# Patient Record
Sex: MALE | Race: WHITE | NOT HISPANIC OR LATINO | Employment: OTHER | URBAN - METROPOLITAN AREA
[De-identification: names, ages, dates, MRNs, and addresses within clinical notes are randomized per-mention and may not be internally consistent; named-entity substitution may affect disease eponyms.]

---

## 2017-09-14 ENCOUNTER — ALLSCRIPTS OFFICE VISIT (OUTPATIENT)
Dept: OTHER | Facility: OTHER | Age: 72
End: 2017-09-14

## 2017-09-14 LAB
BILIRUB UR QL STRIP: NEGATIVE
CLARITY UR: NORMAL
COLOR UR: YELLOW
GLUCOSE (HISTORICAL): NEGATIVE
HGB UR QL STRIP.AUTO: NORMAL
KETONES UR STRIP-MCNC: NEGATIVE MG/DL
LEUKOCYTE ESTERASE UR QL STRIP: NEGATIVE
NITRITE UR QL STRIP: NEGATIVE
PH UR STRIP.AUTO: 6 [PH]
PROT UR STRIP-MCNC: NEGATIVE MG/DL
SP GR UR STRIP.AUTO: 1.01
UROBILINOGEN UR QL STRIP.AUTO: 0.2

## 2018-01-12 VITALS
HEIGHT: 73 IN | DIASTOLIC BLOOD PRESSURE: 76 MMHG | WEIGHT: 256 LBS | BODY MASS INDEX: 33.93 KG/M2 | SYSTOLIC BLOOD PRESSURE: 120 MMHG

## 2018-03-14 DIAGNOSIS — C61 MALIGNANT NEOPLASM OF PROSTATE (HCC): ICD-10-CM

## 2018-03-16 ENCOUNTER — OFFICE VISIT (OUTPATIENT)
Dept: UROLOGY | Facility: MEDICAL CENTER | Age: 73
End: 2018-03-16
Payer: COMMERCIAL

## 2018-03-16 VITALS
WEIGHT: 247 LBS | BODY MASS INDEX: 32.74 KG/M2 | DIASTOLIC BLOOD PRESSURE: 74 MMHG | SYSTOLIC BLOOD PRESSURE: 132 MMHG | HEIGHT: 73 IN

## 2018-03-16 DIAGNOSIS — C61 PROSTATE CANCER (HCC): Primary | ICD-10-CM

## 2018-03-16 LAB
SL AMB  POCT GLUCOSE, UA: ABNORMAL
SL AMB LEUKOCYTE ESTERASE,UA: ABNORMAL
SL AMB POCT BILIRUBIN,UA: ABNORMAL
SL AMB POCT BLOOD,UA: ABNORMAL
SL AMB POCT CLARITY,UA: CLEAR
SL AMB POCT COLOR,UA: YELLOW
SL AMB POCT KETONES,UA: ABNORMAL
SL AMB POCT NITRITE,UA: ABNORMAL
SL AMB POCT PH,UA: 7
SL AMB POCT SPECIFIC GRAVITY,UA: 1.01
SL AMB POCT URINE PROTEIN: ABNORMAL
SL AMB POCT UROBILINOGEN: 0.2

## 2018-03-16 PROCEDURE — 81003 URINALYSIS AUTO W/O SCOPE: CPT | Performed by: UROLOGY

## 2018-03-16 PROCEDURE — 99212 OFFICE O/P EST SF 10 MIN: CPT | Performed by: UROLOGY

## 2018-03-16 RX ORDER — METOPROLOL TARTRATE 50 MG/1
TABLET, FILM COATED ORAL
COMMUNITY
Start: 2018-03-02 | End: 2021-11-17 | Stop reason: ALTCHOICE

## 2018-03-16 RX ORDER — WARFARIN SODIUM 5 MG/1
5 TABLET ORAL
COMMUNITY
Start: 2018-03-12 | End: 2019-10-10 | Stop reason: ALTCHOICE

## 2018-03-16 RX ORDER — WARFARIN SODIUM 2.5 MG/1
TABLET ORAL
COMMUNITY
Start: 2018-01-18 | End: 2019-10-10 | Stop reason: ALTCHOICE

## 2018-03-16 NOTE — PROGRESS NOTES
[address] [facility]  [facility]  [address] [facility] [address] [place] [address]  [phone]  www  [facility]  org      NAME: Carlos Oglesby  AGE: 74 y o  SEX: male  : 1945   MRN: [id number]    DATE: 3/16/2018  TIME: 10:21 AM    Assessment and Plan:  Prostate cancer:  Doing well status post IMRT  Recheck PSA in 6 months  Explained to him that I can expect a rise in his PSA as the effect of the Lupron wears off  Chief Complaint     Chief Complaint   Patient presents with    Prostate Cancer       History of Present Illness   Finished IMRT 2016  Last 6 month Lupron 2016  PSA &lt;0 05  has [place] 7 CAP, 3 cores on the right side  Pretreatment PSA 5 74  Overall, he is doing well  Still has some mild hot flashes, no further breast tenderness    No voiding difficulties  S/P lap nephrectomy for oncocytoma 2015  The following portions of the patient's history were reviewed and updated as appropriate: allergies, current medications, past family history, past medical history, past social history, past surgical history and problem list     Review of Systems   Review of Systems    Active Problem List   There is no problem list on file for this patient        Objective   /74 (BP Location: Left arm, Patient Position: Sitting)   Ht 6' 1" (1 854 m)   Wt 112 kg (247 lb)   BMI 32 59 kg/m²     Physical Exam        Current Medications     Current Outpatient Prescriptions:     metoprolol tartrate (LOPRESSOR) 50 mg tablet, , Disp: , Rfl:     warfarin (JANTOVEN) 5 mg tablet, Take 5 mg by mouth, Disp: , Rfl:     warfarin (COUMADIN) 2 5 mg tablet, , Disp: , Rfl:         Raymond Edwards MD

## 2018-03-16 NOTE — LETTER
March 16, 2018     Susanna Joshua MD  [address] [facility]  [address]  [phone]    Patient: Elsa Otero   YOB: 1945   Date of Visit: 3/16/2018       Dear Dr Shanell Gonzalez: Thank you for referring Brandy [place] to me for evaluation  Below are my notes for this consultation  If you have questions, please do not hesitate to call me  I look forward to following your patient along with you           Sincerely,        Stefani Garber MD        CC: No Recipients

## 2018-09-24 ENCOUNTER — OFFICE VISIT (OUTPATIENT)
Dept: UROLOGY | Facility: MEDICAL CENTER | Age: 73
End: 2018-09-24
Payer: COMMERCIAL

## 2018-09-24 VITALS
HEIGHT: 73 IN | WEIGHT: 248 LBS | DIASTOLIC BLOOD PRESSURE: 80 MMHG | SYSTOLIC BLOOD PRESSURE: 140 MMHG | BODY MASS INDEX: 32.87 KG/M2

## 2018-09-24 DIAGNOSIS — C61 PROSTATE CANCER (HCC): Primary | ICD-10-CM

## 2018-09-24 PROCEDURE — 99213 OFFICE O/P EST LOW 20 MIN: CPT | Performed by: UROLOGY

## 2018-09-24 PROCEDURE — 81003 URINALYSIS AUTO W/O SCOPE: CPT | Performed by: UROLOGY

## 2018-09-24 NOTE — LETTER
2018     Odell Mejia MD  [address] [facility]  [address]  [phone]    Patient: Aretha Vail   YOB: 1945   Date of Visit: 2018       Dear Dr Lizet Strickland: Thank you for referring Brianna Day to me for evaluation  Below are my notes for this consultation  If you have questions, please do not hesitate to call me  I look forward to following your patient along with you  Sincerely,        Susan Gandhi MD        CC: No Recipients  Susan Gandhi MD  2018  9:56 AM  Sign at close encounter  [address] [facility]  [place] [phone]  www  [facility]  org      NAME: Alexis Oglesyb  AGE: 74 y o  SEX: male  : 1945   MRN: [id number]    DATE: 2018  TIME: 9:45 AM    Assessment and Plan:  Prostate cancer - doing very well with a very low PSA after IMRT  Follow-up 1 year with PSA  Chief Complaint   No chief complaint on file  History of Present Illness   Prostate cancer:  [place] [address] prostate cancer, 3 cores on the right side  Pretreatment PSA 5 74  Underwent IMRT 2016  Last 6 month Lupron was 2016  PSA was 0 05 2018  He still has occasional hot flashes  Therefore it is possible that he may still have some Lupron effect  The following portions of the patient's history were reviewed and updated as appropriate: allergies, current medications, past family history, past medical history, past social history, past surgical history and problem list     Review of Systems   Review of Systems   Genitourinary: Negative  Active Problem List   There is no problem list on file for this patient        Objective   /80   Ht 6' 1" (1 854 m)   Wt 112 kg (248 lb)   BMI 32 72 kg/m²      Physical Exam        Current Medications     Current Outpatient Prescriptions:     metoprolol tartrate (LOPRESSOR) 50 mg tablet, , Disp: , Rfl:     warfarin (COUMADIN) 2 5 mg tablet, , Disp: , Rfl:     warfarin (JANTOVEN) 5 mg tablet, Take 5 mg by mouth, Disp: , Rfl:         Angela Pablo MD

## 2018-09-24 NOTE — PROGRESS NOTES
[address] [facility]  [facility]  [address] [facility]  [place] [facility]  [phone]  www  [facility]  org      NAME: Amanda Oglesby  AGE: 74 y o  SEX: male  : 1945   MRN: [id number]    DATE: 2018  TIME: 9:45 AM    Assessment and Plan:  Prostate cancer - doing very well with a very low PSA after IMRT  Follow-up 1 year with PSA  Chief Complaint   No chief complaint on file  History of Present Illness   Prostate cancer:  [place] 7 prostate cancer, 3 cores on the right side  Pretreatment PSA 5 74  Underwent IMRT 2016  Last 6 month Lupron was 2016  PSA was 0 05 2018  He still has occasional hot flashes  Therefore it is possible that he may still have some Lupron effect  The following portions of the patient's history were reviewed and updated as appropriate: allergies, current medications, past family history, past medical history, past social history, past surgical history and problem list     Review of Systems   Review of Systems   Genitourinary: Negative  Active Problem List   There is no problem list on file for this patient        Objective   /80   Ht 6' 1" (1 854 m)   Wt 112 kg (248 lb)   BMI 32 72 kg/m²     Physical Exam        Current Medications     Current Outpatient Prescriptions:     metoprolol tartrate (LOPRESSOR) 50 mg tablet, , Disp: , Rfl:     warfarin (COUMADIN) 2 5 mg tablet, , Disp: , Rfl:     warfarin (JANTOVEN) 5 mg tablet, Take 5 mg by mouth, Disp: , Rfl:         Anabelle Van MD

## 2019-10-10 ENCOUNTER — OFFICE VISIT (OUTPATIENT)
Dept: UROLOGY | Facility: MEDICAL CENTER | Age: 74
End: 2019-10-10
Payer: COMMERCIAL

## 2019-10-10 VITALS
DIASTOLIC BLOOD PRESSURE: 76 MMHG | BODY MASS INDEX: 36.03 KG/M2 | HEIGHT: 72 IN | HEART RATE: 86 BPM | SYSTOLIC BLOOD PRESSURE: 120 MMHG | WEIGHT: 266 LBS

## 2019-10-10 DIAGNOSIS — C61 PROSTATE CANCER (HCC): Primary | ICD-10-CM

## 2019-10-10 LAB
SL AMB  POCT GLUCOSE, UA: NORMAL
SL AMB LEUKOCYTE ESTERASE,UA: NORMAL
SL AMB POCT BILIRUBIN,UA: NORMAL
SL AMB POCT BLOOD,UA: NORMAL
SL AMB POCT CLARITY,UA: CLEAR
SL AMB POCT COLOR,UA: YELLOW
SL AMB POCT KETONES,UA: NORMAL
SL AMB POCT NITRITE,UA: NORMAL
SL AMB POCT PH,UA: 6.5
SL AMB POCT SPECIFIC GRAVITY,UA: 1
SL AMB POCT URINE PROTEIN: NORMAL
SL AMB POCT UROBILINOGEN: 0.2

## 2019-10-10 PROCEDURE — 81003 URINALYSIS AUTO W/O SCOPE: CPT | Performed by: UROLOGY

## 2019-10-10 PROCEDURE — 99213 OFFICE O/P EST LOW 20 MIN: CPT | Performed by: UROLOGY

## 2019-10-10 RX ORDER — METOPROLOL TARTRATE 100 MG/1
100 TABLET ORAL 2 TIMES DAILY
COMMUNITY
Start: 2019-05-08 | End: 2020-10-21

## 2019-10-10 RX ORDER — APIXABAN 5 MG/1
TABLET, FILM COATED ORAL
COMMUNITY
Start: 2019-09-23

## 2019-10-10 NOTE — PROGRESS NOTES
[address] [facility]  [facility]  [address] [facility]  [place], [address] [facility]  [phone]  www  [facility]  org      NAME: Rodney Oglesby  AGE: 66 y o  SEX: male  : 1945   MRN: [id number]    DATE: 10/10/2019  TIME: 9:26 AM    Assessment and Plan:    Prostate cancer status post IMRT 3 years ago with good PSA response  Recheck PSA in 1 year  Solitary kidney status post left nephrectomy for oncocytoma-creatinine remained somewhat elevated but stable  Chief Complaint   No chief complaint on file  History of Present Illness   Follow-up prostate cancer:  [place] 7 prostate cancer 3 cores on the right side with pretreatment PSA of 5 74 underwent IMRT 2016  Last 6 month Lupron was 2016  PSA was 0 05 2018 and he was still having some hot flashes, and PSA remains 0 05  Creatinine was 1 48 as of 2019  This is basically stable  Solitary kidney-status post laparoscopic nephrectomy for oncocytoma May of 2015  He is on Eliquis for AFib, diagnosed a year and half ago  The following portions of the patient's history were reviewed and updated as appropriate: allergies, current medications, past family history, past medical history, past social history, past surgical history and problem list   Past Medical History:   Diagnosis Date    Atrial fibrillation ([address] )     BPH with urinary obstruction     Cataract     Colonic polyp     Disorder of kidney and ureter     Elevated PSA     Hematuria, microscopic     Malignant neoplasm of kidney ([address] )     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     Prostate cancer ([address] )     Prostate nodule     Renal oncocytoma      Past Surgical History:   Procedure Laterality Date    CATARACT EXTRACTION  2019    COLONOSCOPY       shoulder  Review of Systems   Review of Systems   Gastrointestinal: Negative  Genitourinary: Negative          Active Problem List   There is no problem list on file for this patient  Objective   /76   Pulse 86   Ht 6' (1 829 m)   Wt 121 kg (266 lb)   BMI 36 08 kg/m²     Physical Exam   Constitutional: He is oriented to person, place, and time  He appears well-developed and well-nourished  HENT:   Head: Normocephalic and atraumatic  Eyes: EOM are normal    Neck: Normal range of motion  Pulmonary/Chest: Effort normal    Musculoskeletal: Normal range of motion  Neurological: He is alert and oriented to person, place, and time  Skin: Skin is warm and dry  Psychiatric: He has a normal mood and affect   His behavior is normal  Judgment and thought content normal            Current Medications     Current Outpatient Medications:     ELIQUIS 5 MG, , Disp: , Rfl:     metoprolol tartrate (LOPRESSOR) 100 mg tablet, Take 100 mg by mouth 2 (two) times a day, Disp: , Rfl:     metoprolol tartrate (LOPRESSOR) 50 mg tablet, , Disp: , Rfl:         Anna Ward MD

## 2020-09-29 ENCOUNTER — TELEPHONE (OUTPATIENT)
Dept: UROLOGY | Facility: MEDICAL CENTER | Age: 75
End: 2020-09-29

## 2020-09-29 NOTE — TELEPHONE ENCOUNTER
This is a patient of Dr Jr Stratton in [facility] labs called and patient went to lab this morning and expected date says 10/10/20  She wanted us to verify that he can have this done today  She did do his lab just needs verbal approval   Please call her back at [phone]

## 2020-09-29 NOTE — TELEPHONE ENCOUNTER
Called [facility] labs  OK to have PSA today, his last PSA was 9/26/19, which was over a year ago

## 2020-10-14 ENCOUNTER — TELEPHONE (OUTPATIENT)
Dept: UROLOGY | Facility: MEDICAL CENTER | Age: 75
End: 2020-10-14

## 2020-10-21 ENCOUNTER — OFFICE VISIT (OUTPATIENT)
Dept: UROLOGY | Facility: MEDICAL CENTER | Age: 75
End: 2020-10-21
Payer: COMMERCIAL

## 2020-10-21 VITALS
DIASTOLIC BLOOD PRESSURE: 66 MMHG | TEMPERATURE: 96.8 F | WEIGHT: 278 LBS | SYSTOLIC BLOOD PRESSURE: 120 MMHG | HEIGHT: 73 IN | HEART RATE: 66 BPM | BODY MASS INDEX: 36.84 KG/M2

## 2020-10-21 DIAGNOSIS — C61 PROSTATE CANCER (HCC): Primary | ICD-10-CM

## 2020-10-21 PROCEDURE — 99213 OFFICE O/P EST LOW 20 MIN: CPT | Performed by: UROLOGY

## 2020-10-21 RX ORDER — ATORVASTATIN CALCIUM 20 MG/1
TABLET, FILM COATED ORAL
COMMUNITY
Start: 2020-10-20

## 2021-02-12 DIAGNOSIS — Z23 ENCOUNTER FOR IMMUNIZATION: ICD-10-CM

## 2021-11-29 ENCOUNTER — OFFICE VISIT (OUTPATIENT)
Dept: UROLOGY | Facility: MEDICAL CENTER | Age: 76
End: 2021-11-29
Payer: COMMERCIAL

## 2021-11-29 VITALS
SYSTOLIC BLOOD PRESSURE: 136 MMHG | DIASTOLIC BLOOD PRESSURE: 86 MMHG | BODY MASS INDEX: 35.62 KG/M2 | HEART RATE: 68 BPM | WEIGHT: 270 LBS

## 2021-11-29 DIAGNOSIS — C61 PROSTATE CANCER (HCC): Primary | ICD-10-CM

## 2021-11-29 LAB
SL AMB  POCT GLUCOSE, UA: NORMAL
SL AMB LEUKOCYTE ESTERASE,UA: NORMAL
SL AMB POCT BILIRUBIN,UA: NORMAL
SL AMB POCT BLOOD,UA: NORMAL
SL AMB POCT CLARITY,UA: CLEAR
SL AMB POCT COLOR,UA: YELLOW
SL AMB POCT KETONES,UA: NORMAL
SL AMB POCT NITRITE,UA: NORMAL
SL AMB POCT PH,UA: 5
SL AMB POCT SPECIFIC GRAVITY,UA: 1.02
SL AMB POCT URINE PROTEIN: NORMAL
SL AMB POCT UROBILINOGEN: 0.2

## 2021-11-29 PROCEDURE — 81002 URINALYSIS NONAUTO W/O SCOPE: CPT | Performed by: UROLOGY

## 2021-11-29 PROCEDURE — 99214 OFFICE O/P EST MOD 30 MIN: CPT | Performed by: UROLOGY

## 2021-11-29 RX ORDER — METOPROLOL TARTRATE 100 MG/1
TABLET ORAL
COMMUNITY
Start: 2021-11-24

## 2023-02-23 ENCOUNTER — OFFICE VISIT (OUTPATIENT)
Dept: UROLOGY | Facility: MEDICAL CENTER | Age: 78
End: 2023-02-23

## 2023-02-23 VITALS
SYSTOLIC BLOOD PRESSURE: 128 MMHG | WEIGHT: 263 LBS | HEART RATE: 56 BPM | HEIGHT: 72 IN | BODY MASS INDEX: 35.62 KG/M2 | DIASTOLIC BLOOD PRESSURE: 84 MMHG | OXYGEN SATURATION: 97 %

## 2023-02-23 DIAGNOSIS — C61 PROSTATE CANCER (HCC): Primary | ICD-10-CM

## 2023-02-23 NOTE — PROGRESS NOTES
UROLOGY FOLLOWUP NOTE     CHIEF COMPLAINT   Melba Bullock is a 76 y o  male with a complaint of   Chief Complaint   Patient presents with   • Prostate Cancer       History of Present Illness:   Melba Bullock is a 76 y o  male known to Dr Batsheva Davenport  Patient last seen in November 2021  Patient is more than 6 years out from IMRT of his prostate cancer  Continues with yearly PSA checks  Patient also has a history of a left radical nephrectomy in 2015 for oncocytoma  He has been doing very well over the last year with no complaints  No gross hematuria pain or otherwise  Renal function has been reviewed and is relatively stable as compared to prior years  PSA, TOTAL AND FREE  [facility]  11/29/2022  Component      PSA, Total   PSA, Free   PSA, % Free   Prostate Specific Antigen     Component 11/29/2022 10/21/2021 09/29/2020           PSA, Total 0 04 0 03 0 05 Load older lab results   PSA, Free -- -- -- Load older lab results   PSA, % Free -- -- -- Load older lab results   Prostate Specific Antigen 0 03 -- --      Urinary Score(s)     Urinary Incontinence Screening    Flowsheet Row Most Recent Value   Urinary Incontinence    Urinary Incontinence? No   Incomplete emptying? No   Urinary frequency? No   Urinary urgency? No   Urinary hesitancy? No   Dysuria (painful difficult urination)? No   Nocturia (waking up to use the bathroom)? No   Straining (having to push to go)? No   Weak stream? No   Intermittent stream? No   Post void dribbling? No          AUA SYMPTOM SCORE    Flowsheet Row Most Recent Value   AUA SYMPTOM SCORE    How often have you had a sensation of not emptying your bladder completely after you finished urinating? 0 (P)     How often have you had to urinate again less than two hours after you finished urinating? 0 (P)     How often have you found you stopped and started again several times when you urinate? 0 (P)     How often have you found it difficult to postpone urination?  1 (P)     How often have you had a weak urinary stream? 0 (P)     How often have you had to push or strain to begin urination? 0 (P)     How many times did you most typically get up to urinate from the time you went to bed at night until the time you got up in the morning? 5 (P)     Quality of Life: If you were to spend the rest of your life with your urinary condition just the way it is now, how would you feel about that? 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    • [facility] )    • Prostate nodule    • Renal oncocytoma        PAST SURGICAL HISTORY:     Past Surgical History:   Procedure Laterality Date   • CATARACT EXTRACTION  01/2019   • COLONOSCOPY         CURRENT MEDICATIONS:     Current Outpatient Medications   Medication Sig Dispense Refill   • atorvastatin (LIPITOR) 20 mg tablet      • ELIQUIS 5 MG      • metoprolol tartrate (LOPRESSOR) 100 mg tablet        No current facility-administered medications for this visit  ALLERGIES:   No Known Allergies    SOCIAL HISTORY:     Social History     Socioeconomic History   • Marital status: /Civil Union     Spouse name: None   • Number of children: None   • Years of education: None   • Highest education level: None   Occupational History   • None   Tobacco Use   • Smoking status: Never   • Smokeless tobacco: Never   Vaping Use   • Vaping Use: Never used   Substance and Sexual Activity   • Alcohol use:  Yes   • Drug use: No   • Sexual activity: None   Other Topics Concern   • None   Social History Narrative   • None     Social Determinants of Health     Financial Resource Strain: Not on file   Food Insecurity: Not on file   Transportation Needs: Not on file   Physical Activity: Not on file   Stress: Not on file   Social Connections: Not on file   Intimate Partner Violence: Not on file   Housing Stability: Not on file       SOCIAL HISTORY:     Family History   Problem Relation Age of Onset   • Heart disease Mother    • Heart disease Father        REVIEW OF SYSTEMS:     Review of Systems   Constitutional: Negative  Respiratory: Negative  Cardiovascular: Negative  Gastrointestinal: Negative  Genitourinary: Negative  Negative for hematuria  Musculoskeletal: Negative  Skin: Negative  Psychiatric/Behavioral: Negative  PHYSICAL EXAM:     /84   Pulse 56   Ht 6' (1 829 m)   Wt 119 kg (263 lb)   SpO2 97%   BMI 35 67 kg/m²     Physical Exam  Vitals reviewed  Constitutional:       Appearance: He is obese  HENT:      Head: Normocephalic  Nose: Nose normal       Mouth/Throat:      Mouth: Mucous membranes are moist    Eyes:      Pupils: Pupils are equal, round, and reactive to light  Cardiovascular:      Rate and Rhythm: Normal rate  Pulmonary:      Effort: Pulmonary effort is normal    Abdominal:      General: Abdomen is flat  Musculoskeletal:         General: Normal range of motion  Cervical back: Normal range of motion  Skin:     General: Skin is warm  Neurological:      General: No focal deficit present  Mental Status: He is alert  Psychiatric:         Mood and Affect: Mood normal          LABS:     CBC: No results found for: WBC, HGB, HCT, MCV, PLT    BMP: No results found for: GLUCOSE, CALCIUM, NA, K, CO2, CL, BUN, CREATININE      IMAGIN/30/22  NM BONE SCAN SPECT WITH WHOLE BODY    Anatomical Region Laterality Modality   Abdomen -- Nuclear Medicine   Bone -- --   Body -- --     Impression    Impression:     1  No definite evidence of osseous metastasis       2  Similar small mild focal activity at posterolateral left 7th rib, likely due   to stable benign lesion on SPECT/CT as described  3  Degenerative changes in thoracolumbar spine, shoulders, sternoclavicular   joints, and knees  4  New small mild focal activity in right mandible, probably secondary to   interval developed dental disease/procedure  New tiny mild focal activity in   left maxilla which could due to interval developed left maxillary sinus disease  Clinical correlation is recommended  5  Solitary right renal activity, consistent with prior left nephrectomy  6  Incidental CT findings as described  ASSESSMENT:     76 y o  male  with LEFT oncocytoma s/p nephrectomy, CaP s/p IMRT    PLAN:     Patient's renal function has been reviewed  I agree with Dr Alla Davis, oncocytoma does not require imaging follow-up  PSA reviewed and remains low  Patient did have a nuclear medicine bone scan due to a questionable area in the spine  This was imaged and is benign  This is expected given the low PSA  PSA can be checked yearly for the remainder of the patient's life  We will see him back with the advanced practitioner team in survivorship clinic in 1 year